# Patient Record
Sex: FEMALE | Race: ASIAN | NOT HISPANIC OR LATINO | URBAN - METROPOLITAN AREA
[De-identification: names, ages, dates, MRNs, and addresses within clinical notes are randomized per-mention and may not be internally consistent; named-entity substitution may affect disease eponyms.]

---

## 2023-05-07 ENCOUNTER — EMERGENCY (EMERGENCY)
Facility: HOSPITAL | Age: 28
LOS: 1 days | Discharge: HOME | End: 2023-05-07
Admitting: EMERGENCY MEDICINE
Payer: MEDICAID

## 2023-05-07 VITALS
HEART RATE: 86 BPM | OXYGEN SATURATION: 100 % | RESPIRATION RATE: 16 BRPM | SYSTOLIC BLOOD PRESSURE: 127 MMHG | DIASTOLIC BLOOD PRESSURE: 65 MMHG | TEMPERATURE: 98 F

## 2023-05-07 PROCEDURE — 99285 EMERGENCY DEPT VISIT HI MDM: CPT

## 2023-05-07 RX ORDER — ACETAMINOPHEN 500 MG
650 TABLET ORAL ONCE
Refills: 0 | Status: COMPLETED | OUTPATIENT
Start: 2023-05-07 | End: 2023-05-07

## 2023-05-07 RX ORDER — IBUPROFEN 200 MG
600 TABLET ORAL ONCE
Refills: 0 | Status: DISCONTINUED | OUTPATIENT
Start: 2023-05-07 | End: 2023-05-07

## 2023-05-07 RX ADMIN — Medication 650 MILLIGRAM(S): at 19:17

## 2023-05-07 NOTE — ED PROVIDER NOTE - OBJECTIVE STATEMENT
28-year-old female with no stated past medical history presenting to the ER with dental pain for the past 4 months.  Patient reports right posterior lower molar pain for 4 months, pain is not worsening (in contrast to triage note).  Patient takes Tylenol at home with minimal relief.  Patient has not yet seen a dentist or oral surgeon.  Patient denies fever/chills or chills, gum swelling, facial swelling, throat pain, neck pain, headache, dizziness, CP, SOB, abdominal pain, N/V/D, recent travel or illness or any other concerns.

## 2023-05-07 NOTE — ED PROVIDER NOTE - CHIEF COMPLAINT
The patient is a 28y Female complaining of 
I personally performed the service described in the documentation recorded by the scribe in my presence, and it accurately and completely records my words and actions.

## 2023-05-07 NOTE — ED PROVIDER NOTE - PROGRESS NOTE DETAILS
ARLIN Rincon: UCG positive, pt reports LMP 3/27, approx 5 weeks and 6 days by dates. Pt denies pelvic pain, abd pain, vaginal bleeding or discharge, urinary complaints or any other concerns. Pt offered and declined lab work and US to confirm pregnancy, prefers to follow up outpatient. Discussed strict return precautions with patient. She will return to the ER with any concerning symptoms.   Setswana  ID [id number] (pt speaks both english and Sami, used interpreted to confirm understanding of positive pregnancy test and offer labs and US)

## 2023-05-07 NOTE — ED PROVIDER NOTE - CLINICAL SUMMARY MEDICAL DECISION MAKING FREE TEXT BOX
28-year-old female with no stated past medical history presenting to the ER with dental pain for the past 4 months. On exam patient is well-appearing, afebrile, no facial swelling, patient with poor dentition the right posterior lower molar has significant decay, no surrounding gum swelling or tenderness.  No clinical evidence of dental abscess or infection.  Patient requesting tooth to be extracted, discussed dental does not extract teeth in the emergency department but she can follow-up in the clinic.  Plan: Pain control, outpatient dental clinic follow-up.

## 2023-05-07 NOTE — ED PROVIDER NOTE - PHYSICAL EXAMINATION
right posterior molar with significant decay, no surrounding gum swelling or tenderness  no facial swelling

## 2023-05-07 NOTE — ED PROVIDER NOTE - PATIENT PORTAL LINK FT
You can access the FollowMyHealth Patient Portal offered by [facility] by registering at the following website: [email or web address]. By joining [facility] [facility]’s FollowMyHealth portal, you will also be able to view your health information using other applications (apps) compatible with our system.

## 2023-05-07 NOTE — ED PROVIDER NOTE - NSFOLLOWUPINSTRUCTIONS_ED_ALL_ED_FT
Follow-up with a dentist within 1 week, clinic information attached please call to make an appointment.  Take ibuprofen 600 mg every 6-8 hours as needed for pain, take with food.  – You can also take Tylenol 650 mg every 6 hours as needed for pain.  Return to the ER with any worsening or concerning symptoms, increased pain, facial swelling, gum swelling, fever/chills or any other concerns. Follow-up with a dentist within 1 week, clinic information attached please call to make an appointment.  Follow up with an OBGYN within 1 week for pregnancy, you can call the clinic at [facility] at [phone] to make an appointment, referral list also attached.  Take Tylenol 650mg every 6 hours as needed for pain  Return to the ER with any worsening or concerning symptoms, increased pain, facial swelling, gum swelling, fever/chills or any other concerns.